# Patient Record
Sex: FEMALE | URBAN - METROPOLITAN AREA
[De-identification: names, ages, dates, MRNs, and addresses within clinical notes are randomized per-mention and may not be internally consistent; named-entity substitution may affect disease eponyms.]

---

## 2017-03-02 ENCOUNTER — APPOINTMENT (RX ONLY)
Dept: URBAN - METROPOLITAN AREA CLINIC 5 | Facility: CLINIC | Age: 27
Setting detail: DERMATOLOGY
End: 2017-03-02

## 2017-03-02 DIAGNOSIS — Z48.89 ENCOUNTER FOR OTHER SPECIFIED SURGICAL AFTERCARE: ICD-10-CM

## 2017-03-02 PROCEDURE — ? RECOMMENDATIONS

## 2017-03-02 NOTE — PROCEDURE: RECOMMENDATIONS
Detail Level: Zone
Recommendation Preamble: The following recommendations were made during the visit:
Recommendations (Free Text): -Do not recommend revising skin tag along incision. It has improved significantly since the last visit. \\n-have spider vein on left buttock evaluated by \\n-do not recommend at home microdermabrasion abrasion. It will likely not improve scar. \\n-counseled patient on appearance of implants when bent over. I advised the patient she is so thin and does not have much fat to hide the implant when she bends over.\\n-if the appearance of the implant continues to bother the patient, she may consider exchange for a smaller implant.